# Patient Record
Sex: FEMALE | Race: BLACK OR AFRICAN AMERICAN | Employment: STUDENT | URBAN - METROPOLITAN AREA
[De-identification: names, ages, dates, MRNs, and addresses within clinical notes are randomized per-mention and may not be internally consistent; named-entity substitution may affect disease eponyms.]

---

## 2022-12-03 ENCOUNTER — APPOINTMENT (OUTPATIENT)
Dept: CT IMAGING | Age: 22
End: 2022-12-03
Payer: COMMERCIAL

## 2022-12-03 ENCOUNTER — HOSPITAL ENCOUNTER (EMERGENCY)
Age: 22
Discharge: HOME | End: 2022-12-03
Attending: STUDENT IN AN ORGANIZED HEALTH CARE EDUCATION/TRAINING PROGRAM
Payer: COMMERCIAL

## 2022-12-03 VITALS
TEMPERATURE: 98.1 F | OXYGEN SATURATION: 99 % | HEIGHT: 66 IN | BODY MASS INDEX: 23.33 KG/M2 | WEIGHT: 145.19 LBS | RESPIRATION RATE: 16 BRPM | HEART RATE: 64 BPM | DIASTOLIC BLOOD PRESSURE: 73 MMHG | SYSTOLIC BLOOD PRESSURE: 111 MMHG

## 2022-12-03 DIAGNOSIS — R10.11 ABDOMINAL PAIN, RIGHT UPPER QUADRANT: Primary | ICD-10-CM

## 2022-12-03 LAB
A/G RATIO: 1.8 (ref 1.1–2.2)
ALBUMIN SERPL-MCNC: 4.2 G/DL (ref 3.4–5)
ALP BLD-CCNC: 55 U/L (ref 40–129)
ALT SERPL-CCNC: 20 U/L (ref 10–40)
ANION GAP SERPL CALCULATED.3IONS-SCNC: 7 MMOL/L (ref 3–16)
AST SERPL-CCNC: 29 U/L (ref 15–37)
BACTERIA: ABNORMAL /HPF
BASOPHILS ABSOLUTE: 0 K/UL (ref 0–0.2)
BASOPHILS RELATIVE PERCENT: 0.5 %
BILIRUB SERPL-MCNC: 0.8 MG/DL (ref 0–1)
BILIRUBIN URINE: NEGATIVE
BLOOD, URINE: ABNORMAL
BUN BLDV-MCNC: 15 MG/DL (ref 7–20)
CALCIUM SERPL-MCNC: 9 MG/DL (ref 8.3–10.6)
CHLORIDE BLD-SCNC: 107 MMOL/L (ref 99–110)
CLARITY: CLEAR
CO2: 25 MMOL/L (ref 21–32)
COLOR: YELLOW
CREAT SERPL-MCNC: 0.7 MG/DL (ref 0.6–1.1)
EOSINOPHILS ABSOLUTE: 0.1 K/UL (ref 0–0.6)
EOSINOPHILS RELATIVE PERCENT: 1.1 %
EPITHELIAL CELLS, UA: ABNORMAL /HPF (ref 0–5)
GFR SERPL CREATININE-BSD FRML MDRD: >60 ML/MIN/{1.73_M2}
GLUCOSE BLD-MCNC: 76 MG/DL (ref 70–99)
GLUCOSE URINE: NEGATIVE MG/DL
HCG(URINE) PREGNANCY TEST: NEGATIVE
HCT VFR BLD CALC: 34.3 % (ref 36–48)
HEMOGLOBIN: 11.4 G/DL (ref 12–16)
KETONES, URINE: NEGATIVE MG/DL
LEUKOCYTE ESTERASE, URINE: ABNORMAL
LIPASE: 29 U/L (ref 13–60)
LYMPHOCYTES ABSOLUTE: 0.8 K/UL (ref 1–5.1)
LYMPHOCYTES RELATIVE PERCENT: 10.8 %
MCH RBC QN AUTO: 28.1 PG (ref 26–34)
MCHC RBC AUTO-ENTMCNC: 33.3 G/DL (ref 31–36)
MCV RBC AUTO: 84.2 FL (ref 80–100)
MICROSCOPIC EXAMINATION: YES
MONOCYTES ABSOLUTE: 0.7 K/UL (ref 0–1.3)
MONOCYTES RELATIVE PERCENT: 9.3 %
NEUTROPHILS ABSOLUTE: 6 K/UL (ref 1.7–7.7)
NEUTROPHILS RELATIVE PERCENT: 78.3 %
NITRITE, URINE: NEGATIVE
PDW BLD-RTO: 14.1 % (ref 12.4–15.4)
PH UA: 6.5 (ref 5–8)
PLATELET # BLD: 229 K/UL (ref 135–450)
PMV BLD AUTO: 9.4 FL (ref 5–10.5)
POTASSIUM REFLEX MAGNESIUM: 4.6 MMOL/L (ref 3.5–5.1)
PROTEIN UA: NEGATIVE MG/DL
RBC # BLD: 4.08 M/UL (ref 4–5.2)
RBC UA: ABNORMAL /HPF (ref 0–4)
SODIUM BLD-SCNC: 139 MMOL/L (ref 136–145)
SPECIFIC GRAVITY UA: <=1.005 (ref 1–1.03)
TOTAL PROTEIN: 6.5 G/DL (ref 6.4–8.2)
URINE TYPE: ABNORMAL
UROBILINOGEN, URINE: 0.2 E.U./DL
WBC # BLD: 7.7 K/UL (ref 4–11)
WBC UA: ABNORMAL /HPF (ref 0–5)

## 2022-12-03 PROCEDURE — 83690 ASSAY OF LIPASE: CPT

## 2022-12-03 PROCEDURE — 6360000004 HC RX CONTRAST MEDICATION: Performed by: STUDENT IN AN ORGANIZED HEALTH CARE EDUCATION/TRAINING PROGRAM

## 2022-12-03 PROCEDURE — 96374 THER/PROPH/DIAG INJ IV PUSH: CPT

## 2022-12-03 PROCEDURE — 85025 COMPLETE CBC W/AUTO DIFF WBC: CPT

## 2022-12-03 PROCEDURE — 6360000002 HC RX W HCPCS: Performed by: STUDENT IN AN ORGANIZED HEALTH CARE EDUCATION/TRAINING PROGRAM

## 2022-12-03 PROCEDURE — 87086 URINE CULTURE/COLONY COUNT: CPT

## 2022-12-03 PROCEDURE — 99285 EMERGENCY DEPT VISIT HI MDM: CPT

## 2022-12-03 PROCEDURE — 80053 COMPREHEN METABOLIC PANEL: CPT

## 2022-12-03 PROCEDURE — 96375 TX/PRO/DX INJ NEW DRUG ADDON: CPT

## 2022-12-03 PROCEDURE — 81001 URINALYSIS AUTO W/SCOPE: CPT

## 2022-12-03 PROCEDURE — 84703 CHORIONIC GONADOTROPIN ASSAY: CPT

## 2022-12-03 PROCEDURE — 74177 CT ABD & PELVIS W/CONTRAST: CPT

## 2022-12-03 RX ORDER — DIPHENHYDRAMINE HYDROCHLORIDE 50 MG/ML
25 INJECTION INTRAMUSCULAR; INTRAVENOUS ONCE
Status: COMPLETED | OUTPATIENT
Start: 2022-12-03 | End: 2022-12-03

## 2022-12-03 RX ORDER — KETOROLAC TROMETHAMINE 15 MG/ML
15 INJECTION, SOLUTION INTRAMUSCULAR; INTRAVENOUS ONCE
Status: COMPLETED | OUTPATIENT
Start: 2022-12-03 | End: 2022-12-03

## 2022-12-03 RX ORDER — ONDANSETRON 2 MG/ML
4 INJECTION INTRAMUSCULAR; INTRAVENOUS ONCE
Status: DISCONTINUED | OUTPATIENT
Start: 2022-12-03 | End: 2022-12-03 | Stop reason: HOSPADM

## 2022-12-03 RX ADMIN — KETOROLAC TROMETHAMINE 15 MG: 15 INJECTION, SOLUTION INTRAMUSCULAR; INTRAVENOUS at 12:34

## 2022-12-03 RX ADMIN — IOHEXOL 100 ML: 350 INJECTION, SOLUTION INTRAVENOUS at 13:43

## 2022-12-03 RX ADMIN — DIPHENHYDRAMINE HYDROCHLORIDE 25 MG: 50 INJECTION, SOLUTION INTRAMUSCULAR; INTRAVENOUS at 13:57

## 2022-12-03 ASSESSMENT — PAIN DESCRIPTION - FREQUENCY: FREQUENCY: CONTINUOUS

## 2022-12-03 ASSESSMENT — PAIN SCALES - GENERAL: PAINLEVEL_OUTOF10: 3

## 2022-12-03 ASSESSMENT — PAIN - FUNCTIONAL ASSESSMENT
PAIN_FUNCTIONAL_ASSESSMENT: 0-10
PAIN_FUNCTIONAL_ASSESSMENT: NONE - DENIES PAIN

## 2022-12-03 ASSESSMENT — PAIN DESCRIPTION - ORIENTATION: ORIENTATION: RIGHT;UPPER

## 2022-12-03 ASSESSMENT — PAIN DESCRIPTION - DESCRIPTORS: DESCRIPTORS: DISCOMFORT

## 2022-12-03 ASSESSMENT — PAIN DESCRIPTION - LOCATION: LOCATION: ABDOMEN

## 2022-12-03 ASSESSMENT — PAIN DESCRIPTION - PAIN TYPE: TYPE: ACUTE PAIN

## 2022-12-03 NOTE — ED NOTES
Pt states that she started with right sided abdominal pain on Monday that has since moved to right upper abd pain and pt denies any nausea or vomiting or diarrhea and states that she feels like she cannot stay full and pt states that the pain was worse on Thursday and Friday and pain is better today although pt came in due to the fact that she was still having pain. Pt denies any increase in urinary frequency or any burning with urination and is currently on her period.       Jayant Pennington RN  12/03/22 [id number]

## 2022-12-03 NOTE — ED NOTES
Pt states that the itching in her throat and tongue is gone at this time.       Rick Rosario RN  12/03/22 [id number]

## 2022-12-03 NOTE — ED NOTES
Pt returned from ct scan and per tech pt is c/o tingling in her throat and tongue although denies difficulty breathing and can swallow without and problems. MD at bedside to evaluate pt.       Enedina Stroud RN  12/03/22 [id number]

## 2022-12-03 NOTE — DISCHARGE INSTRUCTIONS
Follow-up with your primary doctor early next week for reevaluation. I do suggest that he get a right upper quadrant ultrasound if symptoms continue possibly evaluation by surgeon primary care physician's discretion. Return to emergency department for worsening pain, chest pain or shortness of breath, fevers or chills or any new change or worsening symptoms we are always here for reevaluation never hesitate to return.   He did have some mild itching after IV contrast.  Return to emergency department for worsening itching, any swelling of the tongue, lips, throat, any chest pain or shortness of breath or any nausea vomiting diarrhea

## 2022-12-03 NOTE — ED PROVIDER NOTES
Emergency Department Encounter    Patient: Ag Graves  MRN: [id number]  : 2000  Date of Evaluation: 12/3/2022  ED Provider:  Tiny Anderson MD    Triage Chief Complaint:   Abdominal Pain (Right upper abd pain since Monday and pt states that she feels like she cannot stay full)    [place]:  Ag Graves is a 25 y.o. female present with complaints of right-sided abdominal pain. Patient states since Monday she has had intermittent episodes of right side abdominal pain mostly in her right upper quadrant and right flank. Patient states she has had some mild nausea without vomiting. She states the pain is 3 out of 10 currently, stabbing/pressure, worse with palpation without relieving factors. She denies any neck or back pain. Denies urinary symptoms include dysuria, increased frequency, urgency, hematuria. Denies vaginal bleeding or discharge. Denies diarrhea or constipation or blood or melena stools. Denies alcohol or drug use. Denies recent falls or trauma. Denies fevers or chills. Denies previous abdominal surgeries. Denies any chest pain, shortness of breath, cough, sputum production. States the pain does seem to worsen after she eats but also she feels as though she is eating more than normal and cannot stay full. ROS - see HPI, below listed is current ROS at time of my eval:  At least 14 systems reviewed, negative other HPI    No past medical history on file. No past surgical history on file. No family history on file.   Social History     Socioeconomic History    Marital status: Single     Spouse name: Not on file    Number of children: Not on file    Years of education: Not on file    Highest education level: Not on file   Occupational History    Not on file   Tobacco Use    Smoking status: Not on file    Smokeless tobacco: Not on file   Substance and Sexual Activity    Alcohol use: Not on file    Drug use: Not on file    Sexual activity: Not on file   Other Topics Concern Not on file   Social History Narrative    Not on file     Social Determinants of Health     Financial Resource Strain: Not on file   Food Insecurity: Not on file   Transportation Needs: Not on file   Physical Activity: Not on file   Stress: Not on file   Social Connections: Not on file   Intimate Partner Violence: Not on file   Housing Stability: Not on file     No current facility-administered medications for this encounter. No current outpatient medications on file. Not on File    Nursing Notes Reviewed    Physical Exam:  Triage VS:    ED Triage Vitals [12/03/22 1207]   Enc Vitals Group      /73      Heart Rate 71      Resp 16      Temp 98.1 °F (36.7 °C)      Temp Source Oral      SpO2 99 %      Weight 145 lb 3 oz (65.9 kg)      Height 5' 5.5\" (1.664 m)      Head Circumference       Peak Flow       Pain Score       Pain Loc       Pain Edu? Excl. in [address]? My pulse ox interpretation is - normal    General appearance:  No acute distress. Skin:  Warm. Dry. Eye:  Extraocular movements intact. Ears, nose, mouth and throat:  Oral mucosa moist   Neck:  Trachea midline. Extremity:  No swelling. Normal ROM     Heart:  Regular rate and rhythm, normal S1 & S2, no extra heart sounds. Perfusion:  intact  Respiratory:  Lungs clear to auscultation bilaterally. Respirations nonlabored. Abdominal:  Normal bowel sounds. Soft. Discomfort in the right upper quadrant with no rebound or guarding, mild discomfort in the right flank, no tenderness palpation of the lower abdomen or pelvis, no CVA tenderness, no central spinal tenderness  Back:  No CVA tenderness to palpation     Neurological:  Alert and oriented times 3. No focal neuro deficits. Psychiatric:  Appropriate    I have reviewed and interpreted all of the currently available lab results from this visit (if applicable):  No results found for this visit on 12/03/22.    Radiographs (if obtained):  Radiologist's Report Reviewed:  No results found. MDM:    43-year-old female presenting with history seen above. Vitals on presentation are reassuring and patient afebrile satting well on room air. Physical exam can be seen above. CBC, CMP, lipase, UA, pregnancy testing as well as CT abdomen pelvis obtained. CBC reveals no leukocytosis. Hemoglobin is 11.4. CMP and lipase are overall reassuring. Urine pregnancy is negative. UA not consistent with infection patient denies any urinary symptoms at this time. CT abdomen pelvis reveals a nondistended gallbladder which limits evaluation. The appendix is not definitively identified due to paucity of fat but patient not having tenderness palpation of the right lower quadrant. There is free fluid noted dependently within the pelvis but again patient not having tenderness palpation of this region patient denies any vaginal bleeding or discharge. Patient does have some mild itching after CT and did give Benadryl. Patient was monitored in ED with total improvement of itching and patient denies any shortness of breath, GI symptoms, hives or rashes or swelling of the throat tongue or lips. I discussed strict return precautions as well as close follow-up. I discussed returning for any signs of allergic reaction and discussed symptoms of anaphylaxis and patient and mother agreed to close follow-up with strict return precautions and patient discharged home. Clinical Impression:  1. Abdominal pain, right upper quadrant          Comment: Please note this report has been produced using speech recognition software and may contain errors related to that system including errors in grammar, punctuation, and spelling, as well as words and phrases that may be inappropriate. Efforts were made to edit the dictations.         Jama Marks MD  12/03/22 [id number]

## 2022-12-05 LAB — URINE CULTURE, ROUTINE: NORMAL
